# Patient Record
Sex: FEMALE | Race: WHITE | NOT HISPANIC OR LATINO | Employment: FULL TIME | URBAN - METROPOLITAN AREA
[De-identification: names, ages, dates, MRNs, and addresses within clinical notes are randomized per-mention and may not be internally consistent; named-entity substitution may affect disease eponyms.]

---

## 2023-03-30 LAB
CHLAMYDIA TRACH., AMPLIFIED: NEGATIVE
N. GONORRHEA, AMPLIFIED: NEGATIVE
TRICHOMONAS VAGINALIS: NEGATIVE

## 2023-09-16 LAB — SARS-COV-2 RESULT: DETECTED

## 2023-09-17 ENCOUNTER — TELEPHONE (OUTPATIENT)
Dept: PRIMARY CARE | Facility: CLINIC | Age: 33
End: 2023-09-17

## 2023-09-17 NOTE — TELEPHONE ENCOUNTER
Pt called at 4:30 pm Friday 9/15/23 with 2 days of fever and congestion; mild cough; tested positive for Covid  Recommend supportive treatment and call back if no better in 48-72 hours

## 2023-09-18 NOTE — TELEPHONE ENCOUNTER
Patient states she is still congested and still has a fever, lowest it has been is 99. Having some chest tightness but she states she has asthma. Overall she feels much better today but Tylenol is working.

## 2023-09-21 ENCOUNTER — TELEPHONE (OUTPATIENT)
Dept: PRIMARY CARE | Facility: CLINIC | Age: 33
End: 2023-09-21

## 2023-09-21 DIAGNOSIS — R50.81 FEVER IN OTHER DISEASES: Primary | ICD-10-CM

## 2023-09-21 DIAGNOSIS — U07.1 COVID-19 VIRUS INFECTION: ICD-10-CM

## 2023-09-21 NOTE — TELEPHONE ENCOUNTER
Patient states she has been home from Covid for a week, she is overall better. Her only concern is she is still having  a fever. She states in the morning they can be 100 to 101 and right now she is 99.6. She has been taking Tylenol every 4 to 6 hours. Is there anything else she should do for the fever? They will not let her return to work if she has a fever.

## 2023-09-29 ENCOUNTER — LAB (OUTPATIENT)
Dept: LAB | Facility: LAB | Age: 33
End: 2023-09-29

## 2023-09-29 LAB — TOBACCO SCREEN, URINE: NEGATIVE

## 2023-11-06 ENCOUNTER — TELEMEDICINE (OUTPATIENT)
Dept: PRIMARY CARE | Facility: CLINIC | Age: 33
End: 2023-11-06

## 2023-11-06 DIAGNOSIS — H57.89 IRRITATION OF LEFT EYE: Primary | ICD-10-CM

## 2023-11-06 PROCEDURE — 99212 OFFICE O/P EST SF 10 MIN: CPT | Performed by: NURSE PRACTITIONER

## 2023-11-06 RX ORDER — CIPROFLOXACIN HYDROCHLORIDE 3 MG/ML
1 SOLUTION/ DROPS OPHTHALMIC 4 TIMES DAILY
COMMUNITY
Start: 2020-11-22 | End: 2023-11-06 | Stop reason: SDUPTHER

## 2023-11-06 RX ORDER — CIPROFLOXACIN HYDROCHLORIDE 3 MG/ML
1 SOLUTION/ DROPS OPHTHALMIC 4 TIMES DAILY
Qty: 5 ML | Refills: 0 | Status: SHIPPED | OUTPATIENT
Start: 2023-11-06 | End: 2023-11-11

## 2023-11-07 NOTE — PROGRESS NOTES
Ivy Tijerina is a 33 y.o. female who presents virtually today for a sick visit    Pt location in [place] and consent obtained. Telemedicine appropriate evaluation completed. Appropriate physical exam done.        Chief Complaint   Patient presents with    eye redness       Symptoms: LEFT eye redness/irritation/watery drainage     Pt states she fell asleep with her contacts in on Saturday night and she woke up Sunday with a red LEFT eye and continued to wear her contacts all day Sunday.  Today she wore her glasses, but her LEFT eye remains irritated.      Pt has been treated in the past with Cipro for this and is requesting eye drops and states she will f/u with her eye doctor this week       Review of Systems  All 13 systems were reviewed and are within normal limits except positive and pertinent negative responses which are noted below or in HPI.        Objective   Vitals:  There were no vitals taken for this visit.        Physical Exam  Eyes:      General:         Left eye: Discharge present.     Conjunctiva/sclera:      Left eye: Left conjunctiva is injected.   Neurological:      Mental Status: She is alert.         Assessment/Plan   Problem List Items Addressed This Visit    None  Visit Diagnoses         Codes    Irritation of left eye    -  Primary H57.89    Relevant Medications    ciprofloxacin (Ciloxan) 0.3 % ophthalmic solution

## 2023-11-07 NOTE — PATIENT INSTRUCTIONS
Thank you for seeing me today.  It was a pleasure to meet you!    #LEFT EYE IRRITATION  Rx Cipro x 5 days  See eye doctor for evaluation    F/U WITH PCP

## 2024-02-26 ENCOUNTER — OFFICE VISIT (OUTPATIENT)
Dept: PRIMARY CARE | Facility: CLINIC | Age: 34
End: 2024-02-26
Payer: COMMERCIAL

## 2024-02-26 VITALS
BODY MASS INDEX: 28.66 KG/M2 | TEMPERATURE: 97.7 F | HEART RATE: 80 BPM | RESPIRATION RATE: 14 BRPM | OXYGEN SATURATION: 98 % | HEIGHT: 67 IN | DIASTOLIC BLOOD PRESSURE: 88 MMHG | SYSTOLIC BLOOD PRESSURE: 136 MMHG | WEIGHT: 182.6 LBS

## 2024-02-26 DIAGNOSIS — E55.9 VITAMIN D DEFICIENCY: ICD-10-CM

## 2024-02-26 DIAGNOSIS — J45.20 MILD INTERMITTENT ASTHMA, UNSPECIFIED WHETHER COMPLICATED (HHS-HCC): ICD-10-CM

## 2024-02-26 DIAGNOSIS — Z00.00 HEALTHCARE MAINTENANCE: Primary | ICD-10-CM

## 2024-02-26 DIAGNOSIS — R03.0 ELEVATED BLOOD PRESSURE READING WITHOUT DIAGNOSIS OF HYPERTENSION: ICD-10-CM

## 2024-02-26 PROBLEM — R12 HEARTBURN: Status: ACTIVE | Noted: 2024-02-26

## 2024-02-26 PROBLEM — R13.19 ESOPHAGEAL DYSPHAGIA: Status: ACTIVE | Noted: 2024-02-26

## 2024-02-26 PROBLEM — J45.909 ASTHMA (HHS-HCC): Status: ACTIVE | Noted: 2024-02-26

## 2024-02-26 PROCEDURE — 99395 PREV VISIT EST AGE 18-39: CPT | Performed by: INTERNAL MEDICINE

## 2024-02-26 PROCEDURE — 1036F TOBACCO NON-USER: CPT | Performed by: INTERNAL MEDICINE

## 2024-02-26 RX ORDER — CETIRIZINE HYDROCHLORIDE 10 MG/1
1 TABLET ORAL NIGHTLY
COMMUNITY
Start: 2021-10-21

## 2024-02-26 RX ORDER — CHOLECALCIFEROL (VITAMIN D3) 25 MCG
1 TABLET ORAL DAILY
COMMUNITY
Start: 2021-02-18

## 2024-02-26 ASSESSMENT — ENCOUNTER SYMPTOMS
FLANK PAIN: 0
WEAKNESS: 0
SORE THROAT: 0
OCCASIONAL FEELINGS OF UNSTEADINESS: 0
NECK PAIN: 0
NAUSEA: 0
PSYCHIATRIC NEGATIVE: 1
BRUISES/BLEEDS EASILY: 0
EYE DISCHARGE: 0
ALLERGIC/IMMUNOLOGIC NEGATIVE: 1
GASTROINTESTINAL NEGATIVE: 1
CONFUSION: 0
TROUBLE SWALLOWING: 0
FREQUENCY: 0
DIZZINESS: 0
ARTHRALGIAS: 0
PALPITATIONS: 0
CHEST TIGHTNESS: 0
VOMITING: 0
DIFFICULTY URINATING: 0
SINUS PAIN: 0
ABDOMINAL PAIN: 0
LIGHT-HEADEDNESS: 0
CARDIOVASCULAR NEGATIVE: 1
SLEEP DISTURBANCE: 0
CONSTIPATION: 0
NERVOUS/ANXIOUS: 0
SINUS PRESSURE: 0
MUSCULOSKELETAL NEGATIVE: 1
SEIZURES: 0
JOINT SWELLING: 0
HEMATOLOGIC/LYMPHATIC NEGATIVE: 1
EYES NEGATIVE: 1
ACTIVITY CHANGE: 0
APPETITE CHANGE: 0
HALLUCINATIONS: 0
EYE REDNESS: 0
SPEECH DIFFICULTY: 0
DEPRESSION: 0
FACIAL ASYMMETRY: 0
EYE PAIN: 0
TREMORS: 0
COLOR CHANGE: 0
LOSS OF SENSATION IN FEET: 0
HEADACHES: 0
BLOOD IN STOOL: 0
AGITATION: 0
RESPIRATORY NEGATIVE: 1
COUGH: 0
CONSTITUTIONAL NEGATIVE: 1
NEUROLOGICAL NEGATIVE: 1
UNEXPECTED WEIGHT CHANGE: 0
ADENOPATHY: 0
DYSURIA: 0
POLYDIPSIA: 0
BACK PAIN: 0
WOUND: 0
POLYPHAGIA: 0
VOICE CHANGE: 0
STRIDOR: 0
ENDOCRINE NEGATIVE: 1
NUMBNESS: 0
NECK STIFFNESS: 0
MYALGIAS: 0
SHORTNESS OF BREATH: 0
WHEEZING: 0
DIARRHEA: 0

## 2024-02-26 ASSESSMENT — PATIENT HEALTH QUESTIONNAIRE - PHQ9
2. FEELING DOWN, DEPRESSED OR HOPELESS: NOT AT ALL
SUM OF ALL RESPONSES TO PHQ9 QUESTIONS 1 AND 2: 0
1. LITTLE INTEREST OR PLEASURE IN DOING THINGS: NOT AT ALL

## 2024-02-26 NOTE — PROGRESS NOTES
"Subjective   Patient ID: Smitha Tijerina \"Ivy\" is a 33 y.o. female who presents for Annual Exam (Patient is here for a physical. ).  HPI    Patient has been feeling pretty good and has been complaint with prescribed medications.    We reviewed and discussed details of  blood work: CBC, CMP, TSH, Lipid panel done in 2021.  Results within acceptable range.      Elevated blood pressure noted on arrival. Decreased after resting in the office. I personally rechecked patient's blood pressure.     We discussed importance of low salt diet, weight management, regular sleep and exercise pattern to better control BP.    She continues to work as a nurse, often at nights.     Denies tobacco, no alcohol, no recreational drugs.    Follows with GYN Dr. Yessica Musa.     Review of Systems   Constitutional: Negative.  Negative for activity change, appetite change and unexpected weight change.   HENT: Negative.  Negative for congestion, ear discharge, ear pain, hearing loss, mouth sores, nosebleeds, sinus pressure, sinus pain, sore throat, trouble swallowing and voice change.    Eyes: Negative.  Negative for pain, discharge, redness and visual disturbance.   Respiratory: Negative.  Negative for cough, chest tightness, shortness of breath, wheezing and stridor.    Cardiovascular: Negative.  Negative for chest pain, palpitations and leg swelling.   Gastrointestinal: Negative.  Negative for abdominal pain, blood in stool, constipation, diarrhea, nausea and vomiting.   Endocrine: Negative.  Negative for polydipsia, polyphagia and polyuria.   Genitourinary: Negative.  Negative for difficulty urinating, dysuria, flank pain, frequency and urgency.   Musculoskeletal: Negative.  Negative for arthralgias, back pain, gait problem, joint swelling, myalgias, neck pain and neck stiffness.   Skin: Negative.  Negative for color change, rash and wound.   Allergic/Immunologic: Negative.  Negative for environmental allergies, food allergies and " "immunocompromised state.   Neurological: Negative.  Negative for dizziness, tremors, seizures, syncope, facial asymmetry, speech difficulty, weakness, light-headedness, numbness and headaches.   Hematological: Negative.  Negative for adenopathy. Does not bruise/bleed easily.   Psychiatric/Behavioral: Negative.  Negative for agitation, behavioral problems, confusion, hallucinations, sleep disturbance and suicidal ideas. The patient is not nervous/anxious.    All other systems reviewed and are negative.      Objective     Review of systems was performed and all systems were negative except what in HPI    /88   Pulse 80   Temp 36.5 °C (97.7 °F)   Resp 14   Ht 1.689 m (5' 6.5\")   Wt 82.8 kg (182 lb 9.6 oz)   SpO2 98%   BMI 29.03 kg/m²    Physical Exam  Vitals and nursing note reviewed.   Constitutional:       General: She is not in acute distress.     Appearance: Normal appearance.   HENT:      Head: Normocephalic and atraumatic.      Right Ear: Tympanic membrane, ear canal and external ear normal.      Left Ear: Tympanic membrane, ear canal and external ear normal.      Nose: Nose normal. No congestion or rhinorrhea.      Mouth/Throat:      Mouth: Mucous membranes are moist.      Pharynx: Oropharynx is clear.   Eyes:      General:         Right eye: No discharge.         Left eye: No discharge.      Extraocular Movements: Extraocular movements intact.      Conjunctiva/sclera: Conjunctivae normal.      Pupils: Pupils are equal, round, and reactive to light.   Cardiovascular:      Rate and Rhythm: Normal rate and regular rhythm.      Pulses: Normal pulses.      Heart sounds: Normal heart sounds. No murmur heard.     No friction rub. No gallop.   Pulmonary:      Effort: Pulmonary effort is normal. No respiratory distress.      Breath sounds: Normal breath sounds. No stridor. No wheezing, rhonchi or rales.   Chest:      Chest wall: No tenderness.   Abdominal:      General: Bowel sounds are normal.      " Palpations: Abdomen is soft. There is no mass.      Tenderness: There is no abdominal tenderness. There is no guarding or rebound.   Musculoskeletal:         General: No swelling or deformity. Normal range of motion.      Cervical back: Normal range of motion and neck supple. No rigidity or tenderness.      Right lower leg: No edema.      Left lower leg: No edema.   Lymphadenopathy:      Cervical: No cervical adenopathy.   Skin:     General: Skin is warm and dry.      Coloration: Skin is not jaundiced.      Findings: No bruising or erythema.   Neurological:      General: No focal deficit present.      Mental Status: She is alert and oriented to person, place, and time. Mental status is at baseline.      Cranial Nerves: No cranial nerve deficit.      Motor: No weakness.      Coordination: Coordination normal.      Gait: Gait normal.   Psychiatric:         Mood and Affect: Mood normal.         Behavior: Behavior normal.         Thought Content: Thought content normal.         Judgment: Judgment normal.           Assessment/Plan   Problem List Items Addressed This Visit             ICD-10-CM       Cardiac and Vasculature    Elevated blood pressure reading without diagnosis of hypertension R03.0     Please monitor BP at home, bring records for next visit, DASH diet is advised.            Endocrine/Metabolic    Vitamin D deficiency E55.9    Relevant Orders    Vitamin D 25-Hydroxy,Total (for eval of Vitamin D levels)       Health Encounters    Healthcare maintenance - Primary Z00.00    Relevant Orders    CBC    Comprehensive Metabolic Panel    Lipid Panel    TSH with reflex to Free T4 if abnormal    Urinalysis with Reflex Microscopic       Pulmonary and Pneumonias    Asthma J45.909     Clinically stable, will monitor.         It was a pleasure to see you today.  I would like to remind you about importance of a healthy lifestyle in order to improve your well-being and live longer.  Try to engage in physical activities for  at least 150 minutes per week.  Eat about 10 servings of fruits and vegetables daily. My advice is 2 servings of fruits and 8 servings of vegetables.  For vegetables choose at least half of them green and at least half of them fresh.  Please avoid sugar, salt, fried food and saturated fat.    F/up in 3 months or sooner if needed.

## 2024-02-27 ENCOUNTER — LAB (OUTPATIENT)
Dept: LAB | Facility: LAB | Age: 34
End: 2024-02-27
Payer: COMMERCIAL

## 2024-02-27 DIAGNOSIS — E55.9 VITAMIN D DEFICIENCY: ICD-10-CM

## 2024-02-27 DIAGNOSIS — Z00.00 HEALTHCARE MAINTENANCE: ICD-10-CM

## 2024-02-27 LAB
25(OH)D3 SERPL-MCNC: 27 NG/ML (ref 30–100)
ALBUMIN SERPL BCP-MCNC: 4.5 G/DL (ref 3.4–5)
ALP SERPL-CCNC: 74 U/L (ref 33–110)
ALT SERPL W P-5'-P-CCNC: 10 U/L (ref 7–45)
ANION GAP SERPL CALC-SCNC: 12 MMOL/L (ref 10–20)
APPEARANCE UR: ABNORMAL
AST SERPL W P-5'-P-CCNC: 15 U/L (ref 9–39)
BACTERIA #/AREA URNS AUTO: ABNORMAL /HPF
BILIRUB SERPL-MCNC: 0.5 MG/DL (ref 0–1.2)
BILIRUB UR STRIP.AUTO-MCNC: NEGATIVE MG/DL
BUN SERPL-MCNC: 10 MG/DL (ref 6–23)
CALCIUM SERPL-MCNC: 9.7 MG/DL (ref 8.6–10.3)
CHLORIDE SERPL-SCNC: 102 MMOL/L (ref 98–107)
CHOLEST SERPL-MCNC: 174 MG/DL (ref 0–199)
CHOLESTEROL/HDL RATIO: 3.2
CO2 SERPL-SCNC: 26 MMOL/L (ref 21–32)
COLOR UR: YELLOW
CREAT SERPL-MCNC: 0.77 MG/DL (ref 0.5–1.05)
EGFRCR SERPLBLD CKD-EPI 2021: >90 ML/MIN/1.73M*2
ERYTHROCYTE [DISTWIDTH] IN BLOOD BY AUTOMATED COUNT: 13.2 % (ref 11.5–14.5)
GLUCOSE SERPL-MCNC: 93 MG/DL (ref 74–99)
GLUCOSE UR STRIP.AUTO-MCNC: NEGATIVE MG/DL
HCT VFR BLD AUTO: 43.5 % (ref 36–46)
HDLC SERPL-MCNC: 55 MG/DL
HGB BLD-MCNC: 14.4 G/DL (ref 12–16)
KETONES UR STRIP.AUTO-MCNC: NEGATIVE MG/DL
LDLC SERPL CALC-MCNC: 103 MG/DL
LEUKOCYTE ESTERASE UR QL STRIP.AUTO: ABNORMAL
MCH RBC QN AUTO: 29.8 PG (ref 26–34)
MCHC RBC AUTO-ENTMCNC: 33.1 G/DL (ref 32–36)
MCV RBC AUTO: 90 FL (ref 80–100)
MUCOUS THREADS #/AREA URNS AUTO: ABNORMAL /LPF
NITRITE UR QL STRIP.AUTO: NEGATIVE
NON HDL CHOLESTEROL: 119 MG/DL (ref 0–149)
NRBC BLD-RTO: 0 /100 WBCS (ref 0–0)
PH UR STRIP.AUTO: 7 [PH]
PLATELET # BLD AUTO: 347 X10*3/UL (ref 150–450)
POTASSIUM SERPL-SCNC: 4.2 MMOL/L (ref 3.5–5.3)
PROT SERPL-MCNC: 7.4 G/DL (ref 6.4–8.2)
PROT UR STRIP.AUTO-MCNC: ABNORMAL MG/DL
RBC # BLD AUTO: 4.83 X10*6/UL (ref 4–5.2)
RBC # UR STRIP.AUTO: NEGATIVE /UL
RBC #/AREA URNS AUTO: ABNORMAL /HPF
SODIUM SERPL-SCNC: 136 MMOL/L (ref 136–145)
SP GR UR STRIP.AUTO: 1.02
SQUAMOUS #/AREA URNS AUTO: ABNORMAL /HPF
TRIGL SERPL-MCNC: 79 MG/DL (ref 0–149)
TSH SERPL-ACNC: 1.46 MIU/L (ref 0.44–3.98)
UROBILINOGEN UR STRIP.AUTO-MCNC: <2 MG/DL
VLDL: 16 MG/DL (ref 0–40)
WBC # BLD AUTO: 4.7 X10*3/UL (ref 4.4–11.3)
WBC #/AREA URNS AUTO: ABNORMAL /HPF

## 2024-02-27 PROCEDURE — 36415 COLL VENOUS BLD VENIPUNCTURE: CPT

## 2024-02-27 PROCEDURE — 80053 COMPREHEN METABOLIC PANEL: CPT

## 2024-02-27 PROCEDURE — 84443 ASSAY THYROID STIM HORMONE: CPT

## 2024-02-27 PROCEDURE — 81001 URINALYSIS AUTO W/SCOPE: CPT

## 2024-02-27 PROCEDURE — 82306 VITAMIN D 25 HYDROXY: CPT

## 2024-02-27 PROCEDURE — 85027 COMPLETE CBC AUTOMATED: CPT

## 2024-02-27 PROCEDURE — 80061 LIPID PANEL: CPT

## 2024-02-28 DIAGNOSIS — N39.0 URINARY TRACT INFECTION WITHOUT HEMATURIA, SITE UNSPECIFIED: Primary | ICD-10-CM

## 2024-05-28 ENCOUNTER — APPOINTMENT (OUTPATIENT)
Dept: PRIMARY CARE | Facility: CLINIC | Age: 34
End: 2024-05-28
Payer: COMMERCIAL

## 2024-05-29 ENCOUNTER — HOSPITAL ENCOUNTER (EMERGENCY)
Facility: HOSPITAL | Age: 34
Discharge: HOME | End: 2024-05-29
Payer: COMMERCIAL

## 2024-05-29 VITALS
BODY MASS INDEX: 28.25 KG/M2 | HEIGHT: 67 IN | WEIGHT: 180 LBS | TEMPERATURE: 97.9 F | SYSTOLIC BLOOD PRESSURE: 139 MMHG | OXYGEN SATURATION: 98 % | RESPIRATION RATE: 16 BRPM | HEART RATE: 64 BPM | DIASTOLIC BLOOD PRESSURE: 98 MMHG

## 2024-05-29 DIAGNOSIS — N39.0 UTI (URINARY TRACT INFECTION), UNCOMPLICATED: Primary | ICD-10-CM

## 2024-05-29 DIAGNOSIS — R11.2 NAUSEA AND VOMITING, UNSPECIFIED VOMITING TYPE: ICD-10-CM

## 2024-05-29 DIAGNOSIS — R03.0 ELEVATED BLOOD PRESSURE READING: ICD-10-CM

## 2024-05-29 LAB
ALBUMIN SERPL BCP-MCNC: 4.6 G/DL (ref 3.4–5)
ALP SERPL-CCNC: 72 U/L (ref 33–110)
ALT SERPL W P-5'-P-CCNC: 22 U/L (ref 7–45)
ANION GAP SERPL CALC-SCNC: 13 MMOL/L (ref 10–20)
APPEARANCE UR: ABNORMAL
AST SERPL W P-5'-P-CCNC: 21 U/L (ref 9–39)
B-HCG SERPL-ACNC: <2 MIU/ML
BACTERIA #/AREA URNS AUTO: ABNORMAL /HPF
BASOPHILS # BLD AUTO: 0.03 X10*3/UL (ref 0–0.1)
BASOPHILS NFR BLD AUTO: 0.3 %
BILIRUB SERPL-MCNC: 0.8 MG/DL (ref 0–1.2)
BILIRUB UR STRIP.AUTO-MCNC: NEGATIVE MG/DL
BUN SERPL-MCNC: 8 MG/DL (ref 6–23)
CALCIUM SERPL-MCNC: 10 MG/DL (ref 8.6–10.3)
CHLORIDE SERPL-SCNC: 100 MMOL/L (ref 98–107)
CO2 SERPL-SCNC: 28 MMOL/L (ref 21–32)
COLOR UR: ABNORMAL
CREAT SERPL-MCNC: 0.58 MG/DL (ref 0.5–1.05)
EGFRCR SERPLBLD CKD-EPI 2021: >90 ML/MIN/1.73M*2
EOSINOPHIL # BLD AUTO: 0 X10*3/UL (ref 0–0.7)
EOSINOPHIL NFR BLD AUTO: 0 %
ERYTHROCYTE [DISTWIDTH] IN BLOOD BY AUTOMATED COUNT: 13.2 % (ref 11.5–14.5)
GLUCOSE SERPL-MCNC: 109 MG/DL (ref 74–99)
GLUCOSE UR STRIP.AUTO-MCNC: NORMAL MG/DL
HCT VFR BLD AUTO: 44.4 % (ref 36–46)
HGB BLD-MCNC: 14.6 G/DL (ref 12–16)
IMM GRANULOCYTES # BLD AUTO: 0.02 X10*3/UL (ref 0–0.7)
IMM GRANULOCYTES NFR BLD AUTO: 0.2 % (ref 0–0.9)
KETONES UR STRIP.AUTO-MCNC: ABNORMAL MG/DL
LEUKOCYTE ESTERASE UR QL STRIP.AUTO: ABNORMAL
LIPASE SERPL-CCNC: 9 U/L (ref 9–82)
LYMPHOCYTES # BLD AUTO: 0.77 X10*3/UL (ref 1.2–4.8)
LYMPHOCYTES NFR BLD AUTO: 7.7 %
MAGNESIUM SERPL-MCNC: 1.94 MG/DL (ref 1.6–2.4)
MCH RBC QN AUTO: 29 PG (ref 26–34)
MCHC RBC AUTO-ENTMCNC: 32.9 G/DL (ref 32–36)
MCV RBC AUTO: 88 FL (ref 80–100)
MONOCYTES # BLD AUTO: 0.57 X10*3/UL (ref 0.1–1)
MONOCYTES NFR BLD AUTO: 5.7 %
MUCOUS THREADS #/AREA URNS AUTO: ABNORMAL /LPF
NEUTROPHILS # BLD AUTO: 8.62 X10*3/UL (ref 1.2–7.7)
NEUTROPHILS NFR BLD AUTO: 86.1 %
NITRITE UR QL STRIP.AUTO: NEGATIVE
NRBC BLD-RTO: 0 /100 WBCS (ref 0–0)
PH UR STRIP.AUTO: 7 [PH]
PLATELET # BLD AUTO: 319 X10*3/UL (ref 150–450)
POTASSIUM SERPL-SCNC: 4 MMOL/L (ref 3.5–5.3)
PROT SERPL-MCNC: 8 G/DL (ref 6.4–8.2)
PROT UR STRIP.AUTO-MCNC: ABNORMAL MG/DL
RBC # BLD AUTO: 5.03 X10*6/UL (ref 4–5.2)
RBC # UR STRIP.AUTO: NEGATIVE /UL
RBC #/AREA URNS AUTO: >20 /HPF
SODIUM SERPL-SCNC: 137 MMOL/L (ref 136–145)
SP GR UR STRIP.AUTO: 1.03
SQUAMOUS #/AREA URNS AUTO: ABNORMAL /HPF
UROBILINOGEN UR STRIP.AUTO-MCNC: NORMAL MG/DL
WBC # BLD AUTO: 10 X10*3/UL (ref 4.4–11.3)
WBC #/AREA URNS AUTO: >50 /HPF

## 2024-05-29 PROCEDURE — 2500000004 HC RX 250 GENERAL PHARMACY W/ HCPCS (ALT 636 FOR OP/ED)

## 2024-05-29 PROCEDURE — 81001 URINALYSIS AUTO W/SCOPE: CPT | Performed by: EMERGENCY MEDICINE

## 2024-05-29 PROCEDURE — 84702 CHORIONIC GONADOTROPIN TEST: CPT | Performed by: EMERGENCY MEDICINE

## 2024-05-29 PROCEDURE — 96361 HYDRATE IV INFUSION ADD-ON: CPT

## 2024-05-29 PROCEDURE — 87086 URINE CULTURE/COLONY COUNT: CPT | Mod: STJLAB | Performed by: EMERGENCY MEDICINE

## 2024-05-29 PROCEDURE — 2500000005 HC RX 250 GENERAL PHARMACY W/O HCPCS

## 2024-05-29 PROCEDURE — 99284 EMERGENCY DEPT VISIT MOD MDM: CPT | Mod: 25

## 2024-05-29 PROCEDURE — 99284 EMERGENCY DEPT VISIT MOD MDM: CPT

## 2024-05-29 PROCEDURE — 36415 COLL VENOUS BLD VENIPUNCTURE: CPT | Performed by: EMERGENCY MEDICINE

## 2024-05-29 PROCEDURE — 85025 COMPLETE CBC W/AUTO DIFF WBC: CPT | Performed by: EMERGENCY MEDICINE

## 2024-05-29 PROCEDURE — 83735 ASSAY OF MAGNESIUM: CPT | Performed by: EMERGENCY MEDICINE

## 2024-05-29 PROCEDURE — 83690 ASSAY OF LIPASE: CPT | Performed by: EMERGENCY MEDICINE

## 2024-05-29 PROCEDURE — 80053 COMPREHEN METABOLIC PANEL: CPT | Performed by: EMERGENCY MEDICINE

## 2024-05-29 PROCEDURE — 96374 THER/PROPH/DIAG INJ IV PUSH: CPT

## 2024-05-29 PROCEDURE — 2500000001 HC RX 250 WO HCPCS SELF ADMINISTERED DRUGS (ALT 637 FOR MEDICARE OP)

## 2024-05-29 RX ORDER — ONDANSETRON 4 MG/1
4 TABLET, FILM COATED ORAL EVERY 8 HOURS PRN
Qty: 15 TABLET | Refills: 0 | Status: SHIPPED | OUTPATIENT
Start: 2024-05-29 | End: 2024-06-05 | Stop reason: ALTCHOICE

## 2024-05-29 RX ORDER — CEPHALEXIN 500 MG/1
500 CAPSULE ORAL ONCE
Status: COMPLETED | OUTPATIENT
Start: 2024-05-29 | End: 2024-05-29

## 2024-05-29 RX ORDER — ACETAMINOPHEN 325 MG/1
975 TABLET ORAL ONCE
Status: COMPLETED | OUTPATIENT
Start: 2024-05-29 | End: 2024-05-29

## 2024-05-29 RX ORDER — CEPHALEXIN 250 MG/1
250 CAPSULE ORAL ONCE
Status: DISCONTINUED | OUTPATIENT
Start: 2024-05-29 | End: 2024-05-29

## 2024-05-29 RX ORDER — ONDANSETRON HYDROCHLORIDE 2 MG/ML
4 INJECTION, SOLUTION INTRAVENOUS ONCE
Status: COMPLETED | OUTPATIENT
Start: 2024-05-29 | End: 2024-05-29

## 2024-05-29 RX ORDER — CEPHALEXIN 500 MG/1
500 CAPSULE ORAL 2 TIMES DAILY
Qty: 14 CAPSULE | Refills: 0 | Status: SHIPPED | OUTPATIENT
Start: 2024-05-29 | End: 2024-06-05

## 2024-05-29 RX ADMIN — SODIUM CHLORIDE, POTASSIUM CHLORIDE, SODIUM LACTATE AND CALCIUM CHLORIDE 1000 ML: 600; 310; 30; 20 INJECTION, SOLUTION INTRAVENOUS at 20:52

## 2024-05-29 RX ADMIN — ACETAMINOPHEN 975 MG: 325 TABLET ORAL at 21:56

## 2024-05-29 RX ADMIN — DIPHENHYDRAMINE HYDROCHLORIDE AND LIDOCAINE HYDROCHLORIDE AND ALUMINUM HYDROXIDE AND MAGNESIUM HYDRO 10 ML: KIT at 21:57

## 2024-05-29 RX ADMIN — ONDANSETRON 4 MG: 2 INJECTION, SOLUTION INTRAMUSCULAR; INTRAVENOUS at 20:25

## 2024-05-29 RX ADMIN — CEPHALEXIN 500 MG: 500 CAPSULE ORAL at 22:22

## 2024-05-29 ASSESSMENT — PAIN DESCRIPTION - DESCRIPTORS: DESCRIPTORS: HEADACHE

## 2024-05-29 ASSESSMENT — LIFESTYLE VARIABLES
HAVE PEOPLE ANNOYED YOU BY CRITICIZING YOUR DRINKING: NO
TOTAL SCORE: 0
HAVE YOU EVER FELT YOU SHOULD CUT DOWN ON YOUR DRINKING: NO
EVER HAD A DRINK FIRST THING IN THE MORNING TO STEADY YOUR NERVES TO GET RID OF A HANGOVER: NO
EVER FELT BAD OR GUILTY ABOUT YOUR DRINKING: NO

## 2024-05-29 ASSESSMENT — PAIN - FUNCTIONAL ASSESSMENT: PAIN_FUNCTIONAL_ASSESSMENT: 0-10

## 2024-05-29 ASSESSMENT — PAIN SCALES - GENERAL
PAINLEVEL_OUTOF10: 3
PAINLEVEL_OUTOF10: 8
PAINLEVEL_OUTOF10: 8

## 2024-05-29 ASSESSMENT — COLUMBIA-SUICIDE SEVERITY RATING SCALE - C-SSRS
6. HAVE YOU EVER DONE ANYTHING, STARTED TO DO ANYTHING, OR PREPARED TO DO ANYTHING TO END YOUR LIFE?: NO
1. IN THE PAST MONTH, HAVE YOU WISHED YOU WERE DEAD OR WISHED YOU COULD GO TO SLEEP AND NOT WAKE UP?: NO
2. HAVE YOU ACTUALLY HAD ANY THOUGHTS OF KILLING YOURSELF?: NO

## 2024-05-29 ASSESSMENT — PAIN DESCRIPTION - LOCATION: LOCATION: HEAD

## 2024-05-29 NOTE — Clinical Note
Smitha Tijerina was seen and treated in our emergency department on 5/29/2024.  She may return to work on 05/31/2024.       If you have any questions or concerns, please don't hesitate to call.      Rita Peacock, APRN-CNP

## 2024-05-30 ENCOUNTER — APPOINTMENT (OUTPATIENT)
Dept: PRIMARY CARE | Facility: CLINIC | Age: 34
End: 2024-05-30
Payer: COMMERCIAL

## 2024-05-30 LAB — HOLD SPECIMEN: NORMAL

## 2024-05-30 NOTE — DISCHARGE INSTRUCTIONS
Complete entire course of antibiotics.  Increase fluid intake.  May take Zofran as needed for nausea ration.  Your blood pressure readings were elevated at during this visit follow-up with your primary care physician for further evaluation.  If symptoms continue follow-up with your primary care physician.    If new symptoms present or current symptoms worsen return to the emergency department.

## 2024-05-30 NOTE — ED PROVIDER NOTES
Emergency Department Encounter  [facility] EMERGENCY MEDICINE    Patient: Smitha Tijerina  MRN: [id number]  : 1990  Date of Evaluation: 2024  ED Provider: THIAGO Pascal      Chief Complaint       Chief Complaint   Patient presents with    Vomiting     Since 1am, unable to keep anything down     [place]    (Location/Symptom, Timing/Onset, Context/Setting, Quality, Duration, Modifying Factors, Severity) Note limiting factors.   Limitations to History: None  Historian: Patient  Records reviewed: EMR inpatient and outpatient notes, Care Everywhere      Smitha Tijerina is a 34 y.o. female with past medical history of asthma, who presents to the emergency department for nausea, vomiting and headache.  Patient states yesterday evening started experiencing nausea and nonbloody vomiting.  She reports woke up with headache that gradually worsened over the day, states she tries to take over-the-counter medication which she could not keep down.  She reports last menstrual cycle 12 days ago.  She denies fever, chills, body aches, dizziness, chest pain, shortness of breath, abdominal pain or diarrhea.  She states she is unsure of the last time she had a bowel movement.    ROS:     Review of Systems  14 systems reviewed and otherwise acutely negative except as in the [place].          Past History     Past Medical History:   Diagnosis Date    Abdominal distension (gaseous) 2016    Abdominal bloating    Acute pharyngitis, unspecified 2016    Sore throat    Alcohol abuse, uncomplicated 2016    Mild alcohol use disorder    Candidiasis of skin and nail 10/07/2015    Cutaneous candidiasis    Elevated blood-pressure reading, without diagnosis of hypertension 2021    Blood pressure elevated without history of HTN    Other specified symptoms and signs involving the digestive system and abdomen 2016    Alternating constipation and diarrhea    Personal history of other diseases  of the respiratory system 04/16/2015    History of acute sinusitis    Personal history of other diseases of the respiratory system 07/03/2016    History of streptococcal pharyngitis    Personal history of other diseases of the respiratory system 01/07/2013    History of influenza    Personal history of other diseases of the respiratory system 12/14/2016    History of upper respiratory infection    Personal history of other infectious and parasitic diseases 07/03/2016    History of infectious mononucleosis    Personal history of other specified conditions 03/10/2015    History of breast lump    Rash and other nonspecific skin eruption 10/07/2015    Rash of groin    Unspecified abdominal pain 11/17/2016    Abdominal cramping    Unspecified abdominal pain 12/04/2016    Abdominal pain of multiple sites     Past Surgical History:   Procedure Laterality Date    ANTERIOR CRUCIATE LIGAMENT REPAIR  12/28/2015    Primary Repair Of Knee Ligament Cruciate Anterior     Social History     Socioeconomic History    Marital status: Single     Spouse name: None    Number of children: None    Years of education: None    Highest education level: None   Occupational History    None   Tobacco Use    Smoking status: Never     Passive exposure: Never    Smokeless tobacco: Never   Vaping Use    Vaping status: Never Used   Substance and Sexual Activity    Alcohol use: Never    Drug use: Never    Sexual activity: None   Other Topics Concern    None   Social History Narrative    None     Social Determinants of Health     Financial Resource Strain: Not on file   Food Insecurity: Not on file   Transportation Needs: Not on file   Physical Activity: Not on file   Stress: Not on file   Social Connections: Not on file   Intimate Partner Violence: Not on file   Housing Stability: Not on file       Medications/Allergies     Previous Medications    CETIRIZINE (ZYRTEC) 10 MG TABLET    Take 1 tablet (10 mg) by mouth once daily at bedtime.     CHOLECALCIFEROL (VITAMIN D-3) 25 MCG (1000 UT) TABLET    Take 1 tablet (25 mcg) by mouth once daily.    MULTIVIT-MIN/FERROUS FUMARATE (MULTI VITAMIN ORAL)    Take by mouth.     No Known Allergies     Physical Exam       ED Triage Vitals [05/29/24 1951]   Temperature Heart Rate Respirations BP   36.6 °C (97.9 °F) 79 16 (!) 157/110      Pulse Ox Temp Source Heart Rate Source Patient Position   99 % Temporal -- Sitting      BP Location FiO2 (%)     Right arm --         Physical Exam  Vitals and nursing note reviewed.   Constitutional:       General: She is not in acute distress.     Appearance: She is not ill-appearing, toxic-appearing or diaphoretic.   HENT:      Head: Normocephalic and atraumatic.      Right Ear: External ear normal.      Left Ear: External ear normal.      Nose: Nose normal.      Mouth/Throat:      Mouth: Mucous membranes are moist.      Pharynx: Oropharynx is clear.   Eyes:      Extraocular Movements: Extraocular movements intact.   Cardiovascular:      Rate and Rhythm: Normal rate and regular rhythm.      Pulses: Normal pulses.      Heart sounds: Normal heart sounds.   Pulmonary:      Effort: Pulmonary effort is normal.      Breath sounds: Normal breath sounds.   Abdominal:      General: Abdomen is flat. Bowel sounds are normal. There is no distension.      Palpations: Abdomen is soft. There is no mass.      Tenderness: There is no abdominal tenderness. There is no right CVA tenderness or left CVA tenderness.      Hernia: No hernia is present.   Musculoskeletal:         General: Normal range of motion.      Cervical back: Normal range of motion and neck supple.   Skin:     General: Skin is warm and dry.   Neurological:      General: No focal deficit present.      Mental Status: She is alert.   Psychiatric:         Mood and Affect: Mood normal.         Behavior: Behavior normal.         Diagnostics   Labs:  Labs Reviewed   CBC WITH AUTO DIFFERENTIAL - Abnormal       Result Value    WBC 10.0       nRBC 0.0      RBC 5.03      Hemoglobin 14.6      Hematocrit 44.4      MCV 88      MCH 29.0      MCHC 32.9      RDW 13.2      Platelets 319      Neutrophils % 86.1      Immature Granulocytes %, Automated 0.2      Lymphocytes % 7.7      Monocytes % 5.7      Eosinophils % 0.0      Basophils % 0.3      Neutrophils Absolute 8.62 (*)     Immature Granulocytes Absolute, Automated 0.02      Lymphocytes Absolute 0.77 (*)     Monocytes Absolute 0.57      Eosinophils Absolute 0.00      Basophils Absolute 0.03     COMPREHENSIVE METABOLIC PANEL - Abnormal    Glucose 109 (*)     Sodium 137      Potassium 4.0      Chloride 100      Bicarbonate 28      Anion Gap 13      Urea Nitrogen 8      Creatinine 0.58      eGFR >90      Calcium 10.0      Albumin 4.6      Alkaline Phosphatase 72      Total Protein 8.0      AST 21      Bilirubin, Total 0.8      ALT 22     URINALYSIS WITH REFLEX CULTURE AND MICROSCOPIC - Abnormal    Color, Urine Light-Orange (*)     Appearance, Urine Ex.Turbid (*)     Specific Gravity, Urine 1.026      pH, Urine 7.0      Protein, Urine 300 (3+) (*)     Glucose, Urine Normal      Blood, Urine NEGATIVE      Ketones, Urine 40 (2+) (*)     Bilirubin, Urine NEGATIVE      Urobilinogen, Urine Normal      Nitrite, Urine NEGATIVE      Leukocyte Esterase, Urine 500 Vivi/µL (*)    MICROSCOPIC ONLY, URINE - Abnormal    WBC, Urine >50 (*)     RBC, Urine >20 (*)     Squamous Epithelial Cells, Urine >100 (4+)      Bacteria, Urine 1+ (*)     Mucus, Urine 4+     LIPASE - Normal    Lipase 9      Narrative:     Venipuncture immediately after or during the administration of Metamizole may lead to falsely low results. Testing should be performed immediately prior to Metamizole dosing.   HUMAN CHORIONIC GONADOTROPIN, SERUM QUANTITATIVE - Normal    HCG, Beta-Quantitative <2      Narrative:      Total HCG measurement is performed using the [employer] Access   Immunoassay which detects intact HCG and free beta HCG subunit.    This  test is not indicated for use as a tumor marker.   HCG testing is performed using a different test methodology at [facility] than other [facility]. Direct result comparison   should only be made within the same method.       MAGNESIUM - Normal    Magnesium 1.94     URINE CULTURE   URINALYSIS WITH REFLEX CULTURE AND MICROSCOPIC    Narrative:     The following orders were created for panel order Urinalysis with Reflex Culture and Microscopic.  Procedure                               Abnormality         Status                     ---------                               -----------         ------                     Urinalysis with Reflex C...[[id number]]  Abnormal            Final result               Extra Urine Gray Tube[[id number]]                            In process                   Please view results for these tests on the individual orders.   EXTRA URINE GRAY TUBE      Radiographs:  No orders to display       Procedures:       EKG: interpreted by this provider  Time:  Indication:  Rate:  Rhythm:  Axis:  Interval:  ST Segment:  Comparison to Prior:      Medical decision making   In brief, Smitha Tijerina is a 34 y.o. female who presented to the emergency department for 2-day history of nausea, nonbloody emesis and 1 day history of progressively worsening headache.   Vitals reviewed.  Physical examination grossly unremarkable.  No abdominal pain upon palpation.  Differential diagnoses extensive but includes:  Acute gastroenteritis  Pregnancy  Acute abdomen  Medical work up includes labs.  Labs shows no leukocytosis, acute anemia or thrombocytopenia.  CMP shows glucose slightly elevated at 109 otherwise no electrolyte derangement, ENRIQUE or hepatic abnormalities.  Magnesium within normal limits at 1.94.  Lipase within normal limits at 9.  ACC quantitative less than 2.  Urinalysis positive for leukocyte esterases, elevated WBC and 1+ bacteria.  Treatment plan included acetaminophen, Zofran and  "1 L of LR.  Patient expressed having heartburn, requesting Maalox.  A BMX ordered.  Given patient's physical exam and diagnostic testing results there is low suspicion for pregnancy, acute abdomen, sepsis.  Given the patient's positive urinalysis we will treat with Keflex, with 1 dose provided during visit.  Patient had several elevated blood pressure reading, she was asymptomatic, I did discuss with patient to follow-up with her primary care physician for further evaluation.  I discussed the differential, results and discharge plan with the patient.  Patient prescribed Keflex and Zofran.  Patient educated on medication administration and to complete entire course of antibiotics.  I emphasized the importance of follow-up with the physician I referred them to in the timeframe recommended. I explained reasons for the patient to return to the emergency department. Questions were addressed. They understand return precautions and discharge instructions. The patient  expressed understanding.            Diagnoses as of 05/29/24 2159   UTI (urinary tract infection), uncomplicated   Nausea and vomiting, unspecified vomiting type   Elevated blood pressure reading      Visit Vitals  BP (!) 157/110 (BP Location: Right arm, Patient Position: Sitting)   Pulse 79   Temp 36.6 °C (97.9 °F) (Temporal)   Resp 16   Ht 1.702 m (5' 7\")   Wt 81.6 kg (180 lb)   SpO2 99%   BMI 28.19 kg/m²   Smoking Status Never   BSA 1.96 m²       Medications   acetaminophen (Tylenol) tablet 975 mg (has no administration in time range)   ondansetron (Zofran) injection 4 mg (has no administration in time range)   lactated Ringer's bolus 1,000 mL (has no administration in time range)           Final Impression    UTI  Nausea and vomiting  Elevated blood pressure reading    DISPOSITION  Disposition: Discharge  Patient condition is: Stable    Comment: Please note this report has been produced using speech recognition software and may contain errors related to " that system including errors in grammar, punctuation, and spelling, as well as words and phrases that may be inappropriate.  If there are any questions or concerns please feel free to contact the dictating provider for clarification.    THIAGO Pascal  [facility]  Emergency department     THIAGO Pascal  05/29/24 6131

## 2024-05-31 LAB — BACTERIA UR CULT: ABNORMAL

## 2024-06-01 ENCOUNTER — TELEPHONE (OUTPATIENT)
Dept: PHARMACY | Facility: HOSPITAL | Age: 34
End: 2024-06-01
Payer: COMMERCIAL

## 2024-06-01 NOTE — PROGRESS NOTES
EDPD Note: Rapid Result Review    Reviewed Ms. Smitha Tijerina 's chart regarding a contaminated urine culture that was taken during their recent emergency room visit. The patient was not told about these results prior to leaving the emergency department. Therefore, patient was contacted and given proper education. Patient seen in the ED for nausea, vomiting and headache. She was discharged with cephalexin 500 mg BID x 7 days. She did not endorse urinary symptoms while in the ED, but states that she has developed some frequency and urgency over the last few days. Recommended patient follow up with PCP for repeat urine culture.     05/29/24 20:09   URINE CULTURE Rpt !   !: Data is abnormal  Rpt: View report in Results Review for more information  Multiple organisms present, probable contamination. Repeat culture if clinically indicated.    No further follow up needed from EDPD Team.     Shantel Long, PharmD

## 2024-06-05 ENCOUNTER — OFFICE VISIT (OUTPATIENT)
Dept: PRIMARY CARE | Facility: CLINIC | Age: 34
End: 2024-06-05
Payer: COMMERCIAL

## 2024-06-05 VITALS
OXYGEN SATURATION: 98 % | WEIGHT: 182.2 LBS | HEIGHT: 67 IN | DIASTOLIC BLOOD PRESSURE: 80 MMHG | RESPIRATION RATE: 16 BRPM | TEMPERATURE: 98 F | HEART RATE: 83 BPM | BODY MASS INDEX: 28.6 KG/M2 | SYSTOLIC BLOOD PRESSURE: 132 MMHG

## 2024-06-05 DIAGNOSIS — N39.0 URINARY TRACT INFECTION WITHOUT HEMATURIA, SITE UNSPECIFIED: ICD-10-CM

## 2024-06-05 DIAGNOSIS — R01.1 HEART MURMUR: Primary | ICD-10-CM

## 2024-06-05 DIAGNOSIS — J45.20 MILD INTERMITTENT ASTHMA, UNSPECIFIED WHETHER COMPLICATED (HHS-HCC): ICD-10-CM

## 2024-06-05 DIAGNOSIS — R03.0 ELEVATED BLOOD PRESSURE READING WITHOUT DIAGNOSIS OF HYPERTENSION: ICD-10-CM

## 2024-06-05 DIAGNOSIS — E55.9 VITAMIN D DEFICIENCY: ICD-10-CM

## 2024-06-05 PROCEDURE — 1036F TOBACCO NON-USER: CPT | Performed by: INTERNAL MEDICINE

## 2024-06-05 PROCEDURE — 99213 OFFICE O/P EST LOW 20 MIN: CPT | Performed by: INTERNAL MEDICINE

## 2024-06-05 ASSESSMENT — ENCOUNTER SYMPTOMS
DIZZINESS: 0
FREQUENCY: 0
NAUSEA: 0
BRUISES/BLEEDS EASILY: 0
EYES NEGATIVE: 1
COLOR CHANGE: 0
EYE DISCHARGE: 0
WOUND: 0
VOICE CHANGE: 0
ENDOCRINE NEGATIVE: 1
DIARRHEA: 0
CONSTITUTIONAL NEGATIVE: 1
POLYPHAGIA: 0
CHEST TIGHTNESS: 0
LIGHT-HEADEDNESS: 0
WEAKNESS: 0
NEUROLOGICAL NEGATIVE: 1
ACTIVITY CHANGE: 0
SINUS PAIN: 0
TREMORS: 0
NECK STIFFNESS: 0
HEMATOLOGIC/LYMPHATIC NEGATIVE: 1
SPEECH DIFFICULTY: 0
DIFFICULTY URINATING: 0
ABDOMINAL PAIN: 0
NERVOUS/ANXIOUS: 0
PSYCHIATRIC NEGATIVE: 1
AGITATION: 0
ALLERGIC/IMMUNOLOGIC NEGATIVE: 1
STRIDOR: 0
CONSTIPATION: 0
EYE PAIN: 0
RESPIRATORY NEGATIVE: 1
MYALGIAS: 0
GASTROINTESTINAL NEGATIVE: 1
BLOOD IN STOOL: 0
WHEEZING: 0
HALLUCINATIONS: 0
CARDIOVASCULAR NEGATIVE: 1
DYSURIA: 0
ADENOPATHY: 0
POLYDIPSIA: 0
COUGH: 0
SORE THROAT: 0
VOMITING: 0
HEADACHES: 0
BACK PAIN: 0
JOINT SWELLING: 0
SHORTNESS OF BREATH: 0
SEIZURES: 0
SLEEP DISTURBANCE: 0
UNEXPECTED WEIGHT CHANGE: 0
SINUS PRESSURE: 0
FLANK PAIN: 0
EYE REDNESS: 0
MUSCULOSKELETAL NEGATIVE: 1
NECK PAIN: 0
APPETITE CHANGE: 0
FACIAL ASYMMETRY: 0
NUMBNESS: 0
CONFUSION: 0
PALPITATIONS: 0
ARTHRALGIAS: 0
TROUBLE SWALLOWING: 0

## 2024-06-05 ASSESSMENT — PATIENT HEALTH QUESTIONNAIRE - PHQ9
1. LITTLE INTEREST OR PLEASURE IN DOING THINGS: MORE THAN HALF THE DAYS
2. FEELING DOWN, DEPRESSED OR HOPELESS: MORE THAN HALF THE DAYS
SUM OF ALL RESPONSES TO PHQ9 QUESTIONS 1 AND 2: 4

## 2024-06-05 NOTE — PROGRESS NOTES
"Subjective   Patient ID: Smitha Tijerina \"Ivy\" is a 34 y.o. female who presents for Follow-up (Pt is here due to a 3 month follow up).  HPI    Patient has been feeling pretty good and has been complaint with prescribed medications.    She was seen at ER about a week ago with c/o fatigue, nausea. Diagnosed with UTI, advised Cephalexin which she is still taking and feeling better.  Urine cultures showed mixed mago.    Highly elevated BP was noted in ER.    Patient as not been monitoring BP at home.  We discussed importance of healthy lifestyle, low salt diet, exercise.     We reviewed and discussed details of recent blood work: CBC, CMP, TSH, Lipid panel, Hb A1c, Vit D done in Feb and May 2024.  Results within acceptable range.      Review of Systems   Constitutional: Negative.  Negative for activity change, appetite change and unexpected weight change.   HENT: Negative.  Negative for congestion, ear discharge, ear pain, hearing loss, mouth sores, nosebleeds, sinus pressure, sinus pain, sore throat, trouble swallowing and voice change.    Eyes: Negative.  Negative for pain, discharge, redness and visual disturbance.   Respiratory: Negative.  Negative for cough, chest tightness, shortness of breath, wheezing and stridor.    Cardiovascular: Negative.  Negative for chest pain, palpitations and leg swelling.   Gastrointestinal: Negative.  Negative for abdominal pain, blood in stool, constipation, diarrhea, nausea and vomiting.   Endocrine: Negative.  Negative for polydipsia, polyphagia and polyuria.   Genitourinary: Negative.  Negative for difficulty urinating, dysuria, flank pain, frequency and urgency.   Musculoskeletal: Negative.  Negative for arthralgias, back pain, gait problem, joint swelling, myalgias, neck pain and neck stiffness.   Skin: Negative.  Negative for color change, rash and wound.   Allergic/Immunologic: Negative.  Negative for environmental allergies, food allergies and immunocompromised state. " "  Neurological: Negative.  Negative for dizziness, tremors, seizures, syncope, facial asymmetry, speech difficulty, weakness, light-headedness, numbness and headaches.   Hematological: Negative.  Negative for adenopathy. Does not bruise/bleed easily.   Psychiatric/Behavioral: Negative.  Negative for agitation, behavioral problems, confusion, hallucinations, sleep disturbance and suicidal ideas. The patient is not nervous/anxious.    All other systems reviewed and are negative.      Objective     Review of systems was performed and all systems were negative except what in HPI    /80 (BP Location: Left arm, Patient Position: Sitting, BP Cuff Size: Adult)   Pulse 83   Temp 36.7 °C (98 °F) (Temporal)   Resp 16   Ht 1.702 m (5' 7\")   Wt 82.6 kg (182 lb 3.2 oz)   SpO2 98%   BMI 28.54 kg/m²    Physical Exam  Vitals and nursing note reviewed.   Constitutional:       General: She is not in acute distress.     Appearance: Normal appearance.   HENT:      Head: Normocephalic and atraumatic.      Right Ear: External ear normal.      Left Ear: External ear normal.      Nose: Nose normal. No congestion or rhinorrhea.   Eyes:      General:         Right eye: No discharge.         Left eye: No discharge.      Extraocular Movements: Extraocular movements intact.      Conjunctiva/sclera: Conjunctivae normal.      Pupils: Pupils are equal, round, and reactive to light.   Cardiovascular:      Rate and Rhythm: Normal rate and regular rhythm.      Pulses: Normal pulses.      Heart sounds: Murmur heard.      Systolic murmur is present with a grade of 2/6.      No friction rub. No gallop.   Pulmonary:      Effort: Pulmonary effort is normal. No respiratory distress.      Breath sounds: Normal breath sounds. No stridor. No wheezing, rhonchi or rales.   Chest:      Chest wall: No tenderness.   Abdominal:      General: Bowel sounds are normal.      Palpations: Abdomen is soft. There is no mass.      Tenderness: There is no " abdominal tenderness. There is no guarding or rebound.   Musculoskeletal:         General: No swelling or deformity. Normal range of motion.      Cervical back: Normal range of motion and neck supple. No rigidity or tenderness.      Right lower leg: No edema.      Left lower leg: No edema.   Lymphadenopathy:      Cervical: No cervical adenopathy.   Skin:     General: Skin is warm and dry.      Coloration: Skin is not jaundiced.      Findings: No bruising or erythema.   Neurological:      General: No focal deficit present.      Mental Status: She is alert and oriented to person, place, and time. Mental status is at baseline.      Cranial Nerves: No cranial nerve deficit.      Motor: No weakness.      Coordination: Coordination normal.      Gait: Gait normal.   Psychiatric:         Mood and Affect: Mood normal.         Behavior: Behavior normal.         Thought Content: Thought content normal.         Judgment: Judgment normal.         Assessment/Plan   Problem List Items Addressed This Visit             ICD-10-CM       Cardiac and Vasculature    Elevated blood pressure reading without diagnosis of hypertension R03.0     Please monitor BP at home, bring records for next visit, DASH diet is advised.         Heart murmur - Primary R01.1     Please obtain echocardiogram.         Relevant Orders    Transthoracic Echo (TTE) Complete       Endocrine/Metabolic    Vitamin D deficiency E55.9     Continue Vit D supplement.            Genitourinary and Reproductive    Urinary tract infection without hematuria N39.0       Pulmonary and Pneumonias    Asthma ([facility]) J45.909     Clinically stable, will monitor.         It was a pleasure to see you today.  I would like to remind you about importance of a healthy lifestyle in order to improve your well-being and live longer.  Try to engage in physical activities for at least 150 minutes per week.  Eat about 10 servings of fruits and vegetables daily. My advice is 2 servings of  fruits and 8 servings of vegetables.  For vegetables choose at least half of them green and at least half of them fresh.  Please avoid sugar, salt, fried food and saturated fat.    I spent a total of 22 minutes on the date of service for follow up visit, which included preparing to see the patient, face-to-face patient care, completing clinical documentation, obtaining and/or reviewing separately obtained history, performing a medically appropriate examination, counseling and educating the patient/family/caregiver, ordering medications, tests, or procedures, communicating with other health care providers (not separately reported), independently interpreting results (not separately reported), communicating results to the patient/family/caregiver, and care coordination (not separately reported).    F/up in 3 months or sooner if needed.

## 2024-07-16 ENCOUNTER — HOSPITAL ENCOUNTER (OUTPATIENT)
Dept: CARDIOLOGY | Facility: CLINIC | Age: 34
Discharge: HOME | End: 2024-07-16
Payer: COMMERCIAL

## 2024-07-16 DIAGNOSIS — R01.1 HEART MURMUR: ICD-10-CM

## 2024-07-16 LAB
AORTIC VALVE MEAN GRADIENT: 4.3 MMHG
AORTIC VALVE PEAK VELOCITY: 1.35 M/S
AV PEAK GRADIENT: 7.3 MMHG
AVA (PEAK VEL): 3.16 CM2
AVA (VTI): 2.98 CM2
EJECTION FRACTION APICAL 4 CHAMBER: 63.7
EJECTION FRACTION: 62 %
LEFT VENTRICLE INTERNAL DIMENSION DIASTOLE: 4.65 CM (ref 3.5–6)
LEFT VENTRICULAR OUTFLOW TRACT DIAMETER: 2.13 CM
MITRAL VALVE E/A RATIO: 0.59
RIGHT VENTRICLE FREE WALL PEAK S': 13 CM/S
TRICUSPID ANNULAR PLANE SYSTOLIC EXCURSION: 2.1 CM

## 2024-07-16 PROCEDURE — 93306 TTE W/DOPPLER COMPLETE: CPT

## 2024-07-16 PROCEDURE — 93306 TTE W/DOPPLER COMPLETE: CPT | Performed by: STUDENT IN AN ORGANIZED HEALTH CARE EDUCATION/TRAINING PROGRAM

## 2024-07-21 DIAGNOSIS — I51.7 LVH (LEFT VENTRICULAR HYPERTROPHY): Primary | ICD-10-CM

## 2024-07-21 DIAGNOSIS — I35.9 AORTIC VALVE DISORDER: ICD-10-CM

## 2024-07-21 NOTE — RESULT ENCOUNTER NOTE
Please call the patient regarding her abnormal result.  Recent echocardiogram showed mild hypertrophy of heart muscle and abnormality of aortic valve. Please consult cardiology for further evaluation.  Dr. Bond

## 2024-07-23 ENCOUNTER — TELEPHONE (OUTPATIENT)
Dept: CARDIOLOGY | Facility: CLINIC | Age: 34
End: 2024-07-23
Payer: COMMERCIAL

## 2024-09-05 ENCOUNTER — APPOINTMENT (OUTPATIENT)
Dept: PRIMARY CARE | Facility: CLINIC | Age: 34
End: 2024-09-05
Payer: COMMERCIAL

## 2024-11-20 PROBLEM — I35.8 LAMBL'S EXCRESCENCE ON AORTIC VALVE: Status: ACTIVE | Noted: 2024-11-20

## 2024-11-20 NOTE — PROGRESS NOTES
"Chief Complaint:   No chief complaint on file.     History Of Present Illness:    Smitha Tijerina \"Ivy\" is a 34 y.o. female with a history of elevated blood pressure without hypertension, and abnormal aortic valve here for establishment of cardiovascular care.    She was seen with her primary care physician who noted a murmur on exam.  An echocardiogram was ordered and is detailed below.    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  She denies any exertional chest pain or shortness of breath.  She has had no issues with stroke or TIA in the past.    She has been told of elevated blood pressures.  There are times where she has noted a diastolic around 100.  She was hesitant to start medications and wanted to try diet and exercise.  Admittedly she has not been as adherent to that plan as she would like.    She does have a blood pressure machine at home however has not been checking recently.    She does work days as well as nights.  I asked her if she noted that her blood pressure was worse on night shift.  She says that she would believe that it was but she cannot say specifically as she has not been checking recently.    She does have a problem sometimes falling asleep however when she falls asleep she has no issues staying asleep.  She has never been formally tested for sleep disturbance.  She usually gets about 7 hours of straight sleep at night.    Echocardiogram 7/16/2024: EF 62%.  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     Allergies:  Patient has no known allergies.    Outpatient Medications:  Current Outpatient Medications   Medication Instructions    cetirizine (ZyrTEC) 10 mg tablet 1 tablet, Nightly    cholecalciferol (Vitamin D-3) 25 MCG (1000 UT) tablet 1 tablet, Daily    multivit-min/ferrous fumarate (MULTI VITAMIN ORAL) Take by mouth.       Last Recorded Vitals:  Visit Vitals  /80 (BP Location: Right arm, Patient Position: Sitting)   Pulse 73   Ht 1.702 m (5' 7\") " "  Wt 80.7 kg (178 lb)   SpO2 98%   BMI 27.88 kg/m²   Smoking Status Never   BSA 1.95 m²      LASTWT(3):   Wt Readings from Last 3 Encounters:   11/21/24 80.7 kg (178 lb)   06/05/24 82.6 kg (182 lb 3.2 oz)   05/29/24 81.6 kg (180 lb)       Physical Exam:  In general: alert and in no acute distress.   HEENT: Carotid upstrokes normal with no bruits. JVP is normal.   Pulmonary: Clear to auscultation bilaterally.  Cardiovascular: S1, S2, regular. No appreciable murmurs, rubs or gallops.   Lower extremities: Warm. 2+ distal pulses. No edema.     Last Labs:  CBC -  Recent Labs     05/29/24 2005 02/27/24  0936 03/04/21  0956   WBC 10.0 4.7 6.0   HGB 14.6 14.4 14.1   HCT 44.4 43.5 43.9    347 339   MCV 88 90 91       CMP -  Recent Labs     05/29/24 2005 02/27/24  0936 03/04/21  0956    136 139   K 4.0 4.2 4.0    102 103   CO2 28 26 28   ANIONGAP 13 12 12   BUN 8 10 12   CREATININE 0.58 0.77 0.63   EGFR >90 >90  --    MG 1.94  --   --      Recent Labs     05/29/24 2005 02/27/24  0936 03/04/21  0956   ALBUMIN 4.6 4.5 4.5   ALKPHOS 72 74 98   ALT 22 10 35   AST 21 15 31   BILITOT 0.8 0.5 0.4       LIPID PANEL -   Recent Labs     02/27/24 0936 03/04/21  0956   CHOL 174 172   LDLCALC 103*  --    LDLF  --  109*   HDL 55.0 50.0   TRIG 79 64       No results for input(s): \"BNP\", \"HGBA1C\" in the last 77918 hours.        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  We had a lengthy discussion about this with the patient and her mother who is also a nurse.  I told him that there is a selection bias in the literature because of patients who have had a stroke of unknown etiology and then get an echocardiogram.  The presence of Lambl's excrescence will give concern for embolic event.    There are no strict guideline recommendations on medications for this condition.  Nor are there recommendations on repeat imaging.  I explained the only way to truly \"fix\" " this problem would be surgical.  The risk of surgery would outweigh the potential risk for any embolic event in my opinion.  I do not see the need for aspirin or anticoagulant therapy.    I have recommended reassurance at this point.    2) murmur: Likely secondary to slight septal thickening causing outflow murmur.  She is asymptomatic.  This is something we can simply watch over time.  I explained that elevated blood pressures may increase the risk of hypertrophy over time.  I would asked that she check her blood pressure as noted below.  I would recommend that we repeat an echocardiogram over the next couple of years to ensure that there is no change in the septal thickness.    3) elevated blood pressure without hypertension: Asked her to check her blood pressure over the next couple of weeks and call us with readings.  Further recommendations based on those results.  If elevated we can consider the use of nebivolol 5 mg daily.    She tells me that she is not planning on becoming pregnant over the next few years.  I believe that nebivolol would be a good choice of antihypertensive agent if needed.    4) follow-up: 1 to 2 years or sooner if needed.      Daniel Scott MD

## 2024-11-21 ENCOUNTER — APPOINTMENT (OUTPATIENT)
Dept: CARDIOLOGY | Facility: CLINIC | Age: 34
End: 2024-11-21
Payer: COMMERCIAL

## 2024-11-21 VITALS
HEIGHT: 67 IN | HEART RATE: 73 BPM | DIASTOLIC BLOOD PRESSURE: 80 MMHG | BODY MASS INDEX: 27.94 KG/M2 | WEIGHT: 178 LBS | SYSTOLIC BLOOD PRESSURE: 138 MMHG | OXYGEN SATURATION: 98 %

## 2024-11-21 DIAGNOSIS — R03.0 ELEVATED BLOOD PRESSURE READING WITHOUT DIAGNOSIS OF HYPERTENSION: ICD-10-CM

## 2024-11-21 DIAGNOSIS — I51.7 LVH (LEFT VENTRICULAR HYPERTROPHY): ICD-10-CM

## 2024-11-21 DIAGNOSIS — I35.8 LAMBL'S EXCRESCENCE ON AORTIC VALVE: Primary | ICD-10-CM

## 2024-11-21 DIAGNOSIS — I35.9 AORTIC VALVE DISORDER: ICD-10-CM

## 2024-11-21 PROCEDURE — 1036F TOBACCO NON-USER: CPT | Performed by: INTERNAL MEDICINE

## 2024-11-21 PROCEDURE — 3008F BODY MASS INDEX DOCD: CPT | Performed by: INTERNAL MEDICINE

## 2024-11-21 PROCEDURE — 99214 OFFICE O/P EST MOD 30 MIN: CPT | Performed by: INTERNAL MEDICINE

## 2024-11-21 PROCEDURE — 93010 ELECTROCARDIOGRAM REPORT: CPT | Performed by: INTERNAL MEDICINE

## 2024-11-21 PROCEDURE — 99204 OFFICE O/P NEW MOD 45 MIN: CPT | Performed by: INTERNAL MEDICINE

## 2024-11-21 PROCEDURE — 93005 ELECTROCARDIOGRAM TRACING: CPT | Performed by: INTERNAL MEDICINE

## 2024-11-21 ASSESSMENT — ENCOUNTER SYMPTOMS
LOSS OF SENSATION IN FEET: 0
DEPRESSION: 0
OCCASIONAL FEELINGS OF UNSTEADINESS: 0

## 2024-11-21 ASSESSMENT — PAIN SCALES - GENERAL: PAINLEVEL_OUTOF10: 0-NO PAIN

## 2025-01-13 DIAGNOSIS — I10 DIASTOLIC HYPERTENSION: Primary | ICD-10-CM

## 2025-01-13 RX ORDER — NEBIVOLOL 5 MG/1
5 TABLET ORAL DAILY
Qty: 30 TABLET | Refills: 11 | Status: SHIPPED | OUTPATIENT
Start: 2025-01-13 | End: 2026-01-13

## 2025-01-13 NOTE — PROGRESS NOTES
Reviewed blood pressure readings that were submitted.  Systolic blood pressures are predominantly controlled however diastolics are elevated.  Would like to start nebivolol 5 mg once a day and have the patient check her blood pressure over the following 1 to 2 weeks and then call with readings.

## 2025-01-14 ENCOUNTER — TELEPHONE (OUTPATIENT)
Dept: CARDIOLOGY | Facility: CLINIC | Age: 35
End: 2025-01-14
Payer: COMMERCIAL

## 2025-05-08 ENCOUNTER — OFFICE VISIT (OUTPATIENT)
Dept: URGENT CARE | Age: 35
End: 2025-05-08
Payer: COMMERCIAL

## 2025-05-08 VITALS
DIASTOLIC BLOOD PRESSURE: 91 MMHG | HEART RATE: 60 BPM | SYSTOLIC BLOOD PRESSURE: 129 MMHG | RESPIRATION RATE: 16 BRPM | TEMPERATURE: 97.8 F | OXYGEN SATURATION: 99 %

## 2025-05-08 DIAGNOSIS — L02.416 ABSCESS OF LEFT LEG: Primary | ICD-10-CM

## 2025-05-08 RX ORDER — MUPIROCIN 20 MG/G
OINTMENT TOPICAL 3 TIMES DAILY
Qty: 22 G | Refills: 0 | Status: SHIPPED | OUTPATIENT
Start: 2025-05-08 | End: 2025-05-18

## 2025-05-08 RX ORDER — DOXYCYCLINE HYCLATE 100 MG
100 TABLET ORAL 2 TIMES DAILY
Qty: 20 TABLET | Refills: 0 | Status: SHIPPED | OUTPATIENT
Start: 2025-05-08 | End: 2025-05-08 | Stop reason: SDUPTHER

## 2025-05-08 RX ORDER — DOXYCYCLINE 100 MG/1
100 CAPSULE ORAL 2 TIMES DAILY
Qty: 20 CAPSULE | Refills: 0 | Status: SHIPPED | OUTPATIENT
Start: 2025-05-08 | End: 2025-05-18

## 2025-05-08 NOTE — PATIENT INSTRUCTIONS
Abscess PLAN:    1. Take all antibiotics as prescribed.  Take doxycycline as directed.  -Apply topical mupirocin 2 times daily for 7 to 10 days.  -Keep area clean and dry. Do not soak. It is ok to shower.  2. Follow up with your PCP or the wound care clinic for reevaluation in two days.  3. Return to the emergency department or urgent care for new or worsening symptoms.    While in the office today the abscess was first cleaned. Sub-Q Lidocaine was used to numb the area. A small incision was made with scalpel expressing purulent and bloody discharge. Wound dressed with bacitracin and adhesive bandage.      [facility] wound care center ([phone])    [facility] wound care center in [place] ([phone])    [facility]  [address], [place]  [phone]    [facility] wound care center  [address] [place] [address] [phone]    [place] (in the primary physicians billing) wound care center  [address]  [place] [address] [phone]      -If you have any issues making an appointment please call us here at the urgent care so we can assist you.  DISCHARGE INSTRUCTIONS:  Return to the urgent care or emergency department if:  -You have worsening purulent, mucus-like discharge coming from the wound  -You have red or pink streaking from the site  -You have a fever or chills.  -Worsening redness, swelling.  -You have new or worsening pain, or pain that does not get better with medicine.  -You have questions or concerns about your condition or care.      Abscess:    Skin abscesses are warm, painful, pus-filled pockets of infection below the skin surface that may occur on any body surface. Abscesses may be one to several inches in diameter. A skin abscess may go away with application of warm compresses. Otherwise, a doctor treats an abscess by cutting it open and draining the pus. If the abscess is completely drained,  antibiotics usually are not needed. However, if the person has a weakened immune system, the infection has spread into nearby skin , the person has many abscesses, or the abscess is on the middle or upper part of the face, antibiotics that kill staphylococci, such as dicloxacillin and cephalexin, are given. If doctors suspect MRSA is the cause, antibiotics that kill that organism, such as trimethoprim with sulfamethoxazole, clindamycin, or doxycycline, are given.

## 2025-05-08 NOTE — PROGRESS NOTES
"Subjective   Patient ID: Smitha Tijerina \"Ivy\" is a 34 y.o. female. They present today with a chief complaint of Other (Bump on Lower Lt leg x 4 days).    CC: Rash    HPI: Patient presenting for concerns of a rash.    Past Medical History  Allergies as of 05/08/2025    (No Known Allergies)       Prescriptions Prior to Admission[1]    Medical History[2]    Surgical History[3]     reports that she has never smoked. She has never been exposed to tobacco smoke. She has never used smokeless tobacco. She reports that she does not drink alcohol and does not use drugs.    Review of Systems  Review of Systems      After reviewing all body systems I have documented pertinent findings above in the history.  All other Systems reviewed and are negative for complaint.  Pertinent positive and negatives are listed in the above HPI.      Objective    Vitals:    05/08/25 1410   BP: (!) 129/91   Pulse: 60   Resp: 16   Temp: 36.6 °C (97.8 °F)   SpO2: 99%     No LMP recorded.  Physical Exam    General: Alert, oriented, and cooperative.  No acute distress.  No tripoding, nasal flaring, drooling, or stridor. Well developed, well nourished.     Skin: Left posterior calf is positive for a quarter size hard, nodular abscess with slight fluctuance.  There is approximately 3 to 4 cm of surrounding erythema.  No weeping or discharge.  No crepitus.  No open wounds.  No lymphangitis.  Surrounding skin is otherwise warm, and dry. Appropriate color for ethnicity. No busies, purpura or petechiae. No lymphangitis, angioedema, edema,  necrosis, bullae, or modeling. Negative Nikolsky sign. No pain out of proportion.     Eyes: Sclera and conjunctivae normal     Mouth/Throat: No respiratory compromise, tripoding, drooling, muffled voice, stridor, or trismus.  No strawberry tongue.  No angioedema of the lips or tongue.    Cardiac: Regular rate    Respiratory:  No acute respiratory distress.  Regular rate of breathing.      Abdomen: Bowel sounds are " normal.  No distention.  Soft, nontender.      Incision and Drainage    Date/Time: 5/8/2025 2:38 PM    Performed by: Salvador Aguilar PA-C  Authorized by: Salvador Aguilar PA-C    Consent:     Consent obtained:  Verbal    Consent given by:  Patient    Risks, benefits, and alternatives were discussed: yes      Risks discussed:  Bleeding, incomplete drainage and pain    Alternatives discussed:  Observation and referral  Universal protocol:     Patient identity confirmed:  Verbally with patient  Location:     Type:  Abscess    Size:  4cm    Location:  Lower extremity    Lower extremity location:  Leg    Leg location:  L lower leg  Pre-procedure details:     Skin preparation:  Povidone-iodine  Anesthesia:     Anesthesia method:  Local infiltration    Local anesthetic:  Lidocaine 1% w/o epi  Procedure type:     Complexity:  Simple  Procedure details:     Drainage:  Bloody and purulent    Drainage amount:  Moderate    Wound treatment:  Wound left open    Packing materials:  None  Post-procedure details:     Procedure completion:  Tolerated well, no immediate complications      Point of Care Test & Imaging Results from this visit    Imaging  No results found.    Cardiology, Vascular, and Other Imaging  No other imaging results found for the past 2 days      Diagnostic study results (if any) were reviewed by Salvador Aguilar PA-C.    Assessment/Plan   Allergies, medications, history, and pertinent labs/EKGs/Imaging reviewed by Salvador Aguilar PA-C.       MDM:  Exam findings positive for abscess. Patient is in no acute distress. Does not appear systemically ill or toxic. Skin is intact. No lymphangitis. No clinical findings suggestive of sepsis, drug reaction/hypersensitivity reaction, extensive cellulitis, gout, or necrotizing fasciitis.      Procedure Note:  The lesion/abscess was cleaned with Betadine and sterile water. Sub-Q Lidocaine used to numb the area. Small incision was made with scalpel expressing purulent  and bloody discharge. Wound was probed and explored with hemostat, and irrigated with saline. Wound dressed with bacitracin and adhesive bandage.  Pt. tolerated procedure well, stating relief of pressure. Pt discharged home d/t good condition. Advised follow-up with PCP within two days for reevaluation and resolution. Pt/family instructed to return if symptoms worsen or if new symptoms develop. Patient/family expressed understanding and consented to the above plan. No barriers of communication were apparent and I answered all questions.  GEN duodenal changes    Orders and Diagnoses  Diagnoses and all orders for this visit:  Abscess of left leg  -     mupirocin (Bactroban) 2 % ointment; Apply topically 3 times a day for 10 days.  -     doxycycline (Vibramycin) 100 mg capsule; Take 1 capsule (100 mg) by mouth 2 times a day for 10 days. Take with at least 8 ounces (large glass) of water, do not lie down for 30 minutes after  Other orders  -     Incision and Drainage      Medical Admin Record      Patient disposition: Home    Electronically signed by Salvador Aguilar PA-C  2:40 PM         [1] (Not in a hospital admission)   [2]   Past Medical History:  Diagnosis Date    Abdominal distension (gaseous) 12/04/2016    Abdominal bloating    Acute pharyngitis, unspecified 12/14/2016    Sore throat    Alcohol abuse, uncomplicated 06/06/2016    Mild alcohol use disorder    Candidiasis of skin and nail 10/07/2015    Cutaneous candidiasis    Elevated blood-pressure reading, without diagnosis of hypertension 12/08/2021    Blood pressure elevated without history of HTN    Other specified symptoms and signs involving the digestive system and abdomen 11/17/2016    Alternating constipation and diarrhea    Personal history of other diseases of the respiratory system 04/16/2015    History of acute sinusitis    Personal history of other diseases of the respiratory system 07/03/2016    History of streptococcal pharyngitis    Personal  history of other diseases of the respiratory system 01/07/2013    History of influenza    Personal history of other diseases of the respiratory system 12/14/2016    History of upper respiratory infection    Personal history of other infectious and parasitic diseases 07/03/2016    History of infectious mononucleosis    Personal history of other specified conditions 03/10/2015    History of breast lump    Rash and other nonspecific skin eruption 10/07/2015    Rash of groin    Unspecified abdominal pain 11/17/2016    Abdominal cramping    Unspecified abdominal pain 12/04/2016    Abdominal pain of multiple sites   [3]   Past Surgical History:  Procedure Laterality Date    ANTERIOR CRUCIATE LIGAMENT REPAIR  12/28/2015    Primary Repair Of Knee Ligament Cruciate Anterior

## 2025-06-07 ENCOUNTER — APPOINTMENT (OUTPATIENT)
Dept: DERMATOLOGY | Facility: CLINIC | Age: 35
End: 2025-06-07
Payer: COMMERCIAL

## 2025-07-12 ENCOUNTER — APPOINTMENT (OUTPATIENT)
Dept: DERMATOLOGY | Facility: CLINIC | Age: 35
End: 2025-07-12
Payer: COMMERCIAL

## 2025-07-12 DIAGNOSIS — D48.9 NEOPLASM OF UNCERTAIN BEHAVIOR: Primary | ICD-10-CM

## 2025-07-12 PROCEDURE — 1036F TOBACCO NON-USER: CPT | Performed by: NURSE PRACTITIONER

## 2025-07-12 PROCEDURE — 99214 OFFICE O/P EST MOD 30 MIN: CPT | Performed by: NURSE PRACTITIONER

## 2025-07-12 NOTE — PROGRESS NOTES
"Zoe Tijerina \"Ivy\" is a 35 y.o. female who presents for the following: single lesion.          Review of Systems:  No other skin or systemic complaints other than what is documented elsewhere in the note.    The following portions of the chart were reviewed this encounter and updated as appropriate:          Skin Cancer History  Biopsy Log Book  No skin cancers from Specimen Tracking.    Additional History      Specialty Problems    None       Objective   Well appearing patient in no apparent distress; mood and affect are within normal limits.    A focused skin examination was performed. All findings within normal limits unless otherwise noted below.    Assessment/Plan   Skin Exam  1. NEOPLASM OF UNCERTAIN BEHAVIOR  Infratip of the Nose  Pink papule on the tip of the nose measuring 0.2 x 0.2cm, telangiectasis noted within the papule    Specimen 1 - Dermatopathology- DERM LAB  Differential Diagnosis: bcc vs other  Check Margins Yes/No?:    Comments:    Dermpath Lab: Routine Histopathology (formalin-fixed tissue)  Discussed biopsy, pt will proceed.   Results will return in 1-2 weeks, if further treatment is necessary we will discuss treatment options at that time.   If lesion is treated for a non-malignant skin cancer, return to clinic in 4-6 months for FBSE      "

## 2025-07-12 NOTE — Clinical Note
Discussed biopsy, pt will proceed.   Results will return in 1-2 weeks, if further treatment is necessary we will discuss treatment options at that time.   If lesion is treated for a non-malignant skin cancer, return to clinic in 4-6 months for FBSE

## 2025-07-12 NOTE — PROGRESS NOTES
Subjective     Ivy Tijerina is a 35 y.o. female who presents for the following: single lesion.   New patient in for single lesion to tip of nose patient states it has been there for years no prior treatments no symptoms.    Review of Systems:  No other skin or systemic complaints other than what is documented elsewhere in the note.    The following portions of the chart were reviewed this encounter and updated as appropriate:       Skin Cancer History  Biopsy Log Book  No skin cancers from Specimen Tracking.    Additional History    Specialty Problems    None    Past Medical History:  Ivy Tijerina  has a past medical history of Abdominal distension (gaseous) (12/04/2016), Acute pharyngitis, unspecified (12/14/2016), Alcohol abuse, uncomplicated (06/06/2016), Candidiasis of skin and nail (10/07/2015), Elevated blood-pressure reading, without diagnosis of hypertension (12/08/2021), Other specified symptoms and signs involving the digestive system and abdomen (11/17/2016), Personal history of other diseases of the respiratory system (04/16/2015), Personal history of other diseases of the respiratory system (07/03/2016), Personal history of other diseases of the respiratory system (01/07/2013), Personal history of other diseases of the respiratory system (12/14/2016), Personal history of other infectious and parasitic diseases (07/03/2016), Personal history of other specified conditions (03/10/2015), Rash and other nonspecific skin eruption (10/07/2015), Unspecified abdominal pain (11/17/2016), and Unspecified abdominal pain (12/04/2016).    Past Surgical History:  Ivy Tijerina  has a past surgical history that includes Anterior cruciate ligament repair (12/28/2015).    Family History:  Patient family history is not on file.    Social History:  Ivy Tijerina  reports that she has never smoked. She has never been exposed to tobacco smoke. She has never used smokeless tobacco. She reports that she does not drink  alcohol and does not use drugs.    Allergies:  Patient has no known allergies.    Current Medications / CAM's:  Current Medications[1]     Objective   Well appearing patient in no apparent distress; mood and affect are within normal limits.      Assessment/Plan   Skin Exam            [1]   Current Outpatient Medications:     cetirizine (ZyrTEC) 10 mg tablet, Take 1 tablet (10 mg) by mouth once daily at bedtime., Disp: , Rfl:     cholecalciferol (Vitamin D-3) 25 MCG (1000 UT) tablet, Take 1 tablet (25 mcg) by mouth once daily., Disp: , Rfl:     multivit-min/ferrous fumarate (MULTI VITAMIN ORAL), Take by mouth., Disp: , Rfl:     nebivolol (Bystolic) 5 mg tablet, Take 1 tablet (5 mg) by mouth once daily., Disp: 30 tablet, Rfl: 11

## 2025-07-16 LAB
LABORATORY COMMENT REPORT: NORMAL
PATH REPORT.FINAL DX SPEC: NORMAL
PATH REPORT.GROSS SPEC: NORMAL
PATH REPORT.MICROSCOPIC SPEC OTHER STN: NORMAL
PATH REPORT.RELEVANT HX SPEC: NORMAL
PATH REPORT.TOTAL CANCER: NORMAL

## 2025-08-28 ENCOUNTER — APPOINTMENT (OUTPATIENT)
Dept: DERMATOLOGY | Facility: CLINIC | Age: 35
End: 2025-08-28
Payer: COMMERCIAL

## 2025-09-09 ENCOUNTER — APPOINTMENT (OUTPATIENT)
Dept: OBSTETRICS AND GYNECOLOGY | Facility: CLINIC | Age: 35
End: 2025-09-09
Payer: COMMERCIAL

## 2025-10-02 ENCOUNTER — APPOINTMENT (OUTPATIENT)
Dept: PRIMARY CARE | Facility: CLINIC | Age: 35
End: 2025-10-02
Payer: COMMERCIAL